# Patient Record
Sex: MALE | Race: WHITE | NOT HISPANIC OR LATINO | URBAN - METROPOLITAN AREA
[De-identification: names, ages, dates, MRNs, and addresses within clinical notes are randomized per-mention and may not be internally consistent; named-entity substitution may affect disease eponyms.]

---

## 2019-01-08 ENCOUNTER — OFFICE (OUTPATIENT)
Dept: URBAN - METROPOLITAN AREA CLINIC 101 | Facility: CLINIC | Age: 49
End: 2019-01-08

## 2019-01-08 VITALS
SYSTOLIC BLOOD PRESSURE: 136 MMHG | HEIGHT: 71 IN | TEMPERATURE: 98.1 F | HEART RATE: 73 BPM | WEIGHT: 199 LBS | DIASTOLIC BLOOD PRESSURE: 86 MMHG

## 2019-01-08 DIAGNOSIS — R10.11 RIGHT UPPER QUADRANT PAIN: ICD-10-CM

## 2019-01-08 DIAGNOSIS — Z80.0 FAMILY HISTORY OF MALIGNANT NEOPLASM OF DIGESTIVE ORGANS: ICD-10-CM

## 2019-01-08 PROCEDURE — 99244 OFF/OP CNSLTJ NEW/EST MOD 40: CPT

## 2019-01-08 NOTE — SERVICEHPINOTES
COLTEN SIDDIQUI   is a   48   year old male who is being seen in consultation at the request of    for stomach pain. He reports issues have been ongoing for years. Was going to get gallbladder out years ago but passed a stone?  He states he gets "spells" which can last a week at a time of nausea, does not really vomit along with abnormal stools and RUQ abd pain. This occurs a few times per year and has not been able to correlate it with anything. Saw PCP last week who ordered US which was normal with no gallstones or biliary dilation--f/u CT done to evaluate for possible kidney stones was unremarkable. States BMs usually every he eats which is normal for him. He does have a family hx of stomach cancer which he is concerned about--both paternal grandparents had stomach cancer. He had an EGD/colonoscopy approx. 5 years ago and states it was normal. Was on protonix and maybe Dexilant for some time which did not help. Reports some weight loss recently due to not eating much due to the nausea. No fevers although he will get "hot and cold". Episodes have occurred a few times around thanksgiving but does not seem to be related to the food.